# Patient Record
Sex: FEMALE | Race: BLACK OR AFRICAN AMERICAN | NOT HISPANIC OR LATINO
[De-identification: names, ages, dates, MRNs, and addresses within clinical notes are randomized per-mention and may not be internally consistent; named-entity substitution may affect disease eponyms.]

---

## 2020-08-28 ENCOUNTER — DASHBOARD ENCOUNTERS (OUTPATIENT)
Age: 50
End: 2020-08-28

## 2020-08-28 ENCOUNTER — OFFICE VISIT (OUTPATIENT)
Dept: URBAN - METROPOLITAN AREA CLINIC 109 | Facility: CLINIC | Age: 50
End: 2020-08-28
Payer: COMMERCIAL

## 2020-08-28 ENCOUNTER — LAB OUTSIDE AN ENCOUNTER (OUTPATIENT)
Dept: URBAN - METROPOLITAN AREA CLINIC 109 | Facility: CLINIC | Age: 50
End: 2020-08-28

## 2020-08-28 DIAGNOSIS — I10 ESSENTIAL HYPERTENSION: ICD-10-CM

## 2020-08-28 DIAGNOSIS — E11.69 TYPE 2 DIABETES MELLITUS WITH OTHER SPECIFIED COMPLICATION: ICD-10-CM

## 2020-08-28 DIAGNOSIS — E11.9 TYPE 2 DIABETES MELLITUS WITHOUT COMPLICATION, WITHOUT LONG-TERM CURRENT USE OF INSULIN: ICD-10-CM

## 2020-08-28 DIAGNOSIS — Z12.11 COLON CANCER SCREENING: ICD-10-CM

## 2020-08-28 DIAGNOSIS — E66.01 MORBID OBESITY: ICD-10-CM

## 2020-08-28 DIAGNOSIS — E78.5 HYPERLIPIDEMIA, UNSPECIFIED: ICD-10-CM

## 2020-08-28 PROBLEM — 111552007 DIABETES MELLITUS WITHOUT COMPLICATION: Status: ACTIVE | Noted: 2020-08-28

## 2020-08-28 PROBLEM — 55822004: Status: ACTIVE | Noted: 2020-08-28

## 2020-08-28 PROBLEM — 3723001 ARTHRITIS: Status: ACTIVE | Noted: 2020-08-28

## 2020-08-28 PROBLEM — 38341003 HYPERTENSION: Status: ACTIVE | Noted: 2020-08-28

## 2020-08-28 PROBLEM — 761000119102: Status: ACTIVE | Noted: 2020-08-28

## 2020-08-28 PROCEDURE — 99203 OFFICE O/P NEW LOW 30 MIN: CPT | Performed by: INTERNAL MEDICINE

## 2020-08-28 RX ORDER — AMLODIPINE BESYLATE 5 MG/1
1 TABLET TABLET ORAL ONCE A DAY
Status: ACTIVE | COMMUNITY

## 2020-08-28 RX ORDER — LISINOPRIL AND HYDROCHLOROTHIAZIDE 20; 12.5 MG/1; MG/1
1 TABLET TABLET ORAL ONCE A DAY
Status: ACTIVE | COMMUNITY

## 2020-08-28 RX ORDER — METFORMIN HYDROCHLORIDE 500 MG/1
1 TABLET WITH A MEAL TABLET, FILM COATED ORAL ONCE A DAY
Status: ACTIVE | COMMUNITY

## 2020-08-28 RX ORDER — IBUPROFEN 800 MG/1
1 TABLET WITH FOOD OR MILK AS NEEDED TABLET ORAL AS NEEDED
Status: ACTIVE | COMMUNITY

## 2020-08-28 RX ORDER — GLIPIZIDE 10 MG/1
1 TABLET 30 MINUTES BEFORE BREAKFAST TABLET ORAL ONCE A DAY
Status: ACTIVE | COMMUNITY

## 2020-08-28 NOTE — HPI-TODAY'S VISIT:
The patient has been referred for her first screening colonoscopy.  She has no family history of colon cancer or polyps breast cancer or other malignancies.  The patient denies abdominal pain, diarrhea, constipation, change in bowel habits or blood in the stool.  She has had no history of weight loss.  Patient is a non-smoker. Past medical history is remarkable for type 2 diabetes mellitus, hypertension and sleep apnea.

## 2020-10-21 ENCOUNTER — OFFICE VISIT (OUTPATIENT)
Dept: URBAN - METROPOLITAN AREA SURGERY CENTER 23 | Facility: SURGERY CENTER | Age: 50
End: 2020-10-21
Payer: COMMERCIAL

## 2020-10-21 DIAGNOSIS — Z12.11 COLON CANCER SCREENING: ICD-10-CM

## 2020-10-21 PROCEDURE — G9935 CANC NOT DETECTD DURING SRCN: HCPCS | Performed by: INTERNAL MEDICINE

## 2020-10-21 PROCEDURE — 45378 DIAGNOSTIC COLONOSCOPY: CPT | Performed by: INTERNAL MEDICINE

## 2020-10-21 PROCEDURE — G8907 PT DOC NO EVENTS ON DISCHARG: HCPCS | Performed by: INTERNAL MEDICINE

## 2023-02-21 NOTE — PHYSICAL EXAM HENT:
Head,  normocephalic,  atraumatic,  Face,  Face within normal limits,  Ears,  External ears within normal limits,  Nose/Nasopharynx,  External nose  normal appearance,  nares patent,  no nasal discharge Cimetidine Counseling:  I discussed with the patient the risks of Cimetidine including but not limited to gynecomastia, headache, diarrhea, nausea, drowsiness, arrhythmias, pancreatitis, skin rashes, psychosis, bone marrow suppression and kidney toxicity.